# Patient Record
(demographics unavailable — no encounter records)

---

## 2025-04-03 NOTE — HISTORY OF PRESENT ILLNESS
[de-identified] : cough, headache, tired, sore throat since yesterday [FreeTextEntry6] : has 2 more pills of coycyline seen [facility] fro ? sinusiitis feeling better went on [place] trip 2 boys one sick and cough had s/t better 9 days inc 2 days cough now 2 days worse yesterday mucus did work out 2 days ago disucsed hold re sport/gym if ot feel well no fever little tire eating drinkning ok flutocasone start cough mucus no wheeze

## 2025-04-03 NOTE — HISTORY OF PRESENT ILLNESS
[de-identified] : cough, headache, tired, sore throat since yesterday [FreeTextEntry6] : has 2 more pills of coycyline seen [facility] fro ? sinusiitis feeling better went on [place] trip 2 boys one sick and cough had s/t better 9 days inc 2 days cough now 2 days worse yesterday mucus did work out 2 days ago disucsed hold re sport/gym if ot feel well no fever little tire eating drinkning ok flutocasone start cough mucus no wheeze

## 2025-04-03 NOTE — DISCUSSION/SUMMARY
[FreeTextEntry1] : add fluticasoe if strep positive give last day doxy tomorrow note written if not feel well not run sat  supportive care rapid covid test done

## 2025-04-03 NOTE — PHYSICAL EXAM
[TextEntry] : Gen: Awake, alert,  In no acute distress , well appearing Eyes: no periorbital swelling fells mild sinus pressure Ears : right  External Auditory Canal:  Normal. Tympanic Membrane:  Normal             left External Auditory Canal:  Normal   Tympanic Membrane:  Normal Pharynx: no redness ,no sores, not inflamed  Neck supple Lymph: anterior and submandibular glands no lymphadenopathy Cardiac : normal rate, regular rhythm, S1,S2 normal, no murmur Lungs: clear to auscultation, no crackles no wheeze, no grunting flaring or retractions Abdomen: soft, not tender, not distendedar

## 2025-07-01 NOTE — IMAGING
[de-identified] : The patient is a well appearing 18 year  old male of their stated age. Patient ambulates with a normal gait.   Ribs: Deformity: None Tenderness to Palpation: None Rib Compression Test: Negative   Thoracic Spine: Range of Motion: Unrestricted Deformity: None Tenderness to Palpation: None Sensation: Intact to Light Touch   Lumbar Spine: RANGE OF MOTION: Flexion: Unrestricted and without pain Extension: Unrestricted and WITH PAIN Rotation: Unrestricted and without pain   TENDERNESS TO PALPATION: Midline/Vertebral Bodies/Spinous Processes: Negative Paraspinal Muscles:  TENDER LT SIDED LUMBAR SI Joints:  POSITIVE LEFT SIDE   PROVOCATIVE TESTING: Piriformis Stretch Test: Negative Straight Leg Raise: Negative Seated Straight Leg Raise: Negative Pelvic Compression Test: Negative   INSPECTION: Deformity: Negative Erythema: Negative Ecchymosis: Negative Abrasions: Negative Muscle Spasm: Negative   NEUROLOGIC EXAM: Sensation L2-S1: Grossly Intact DTRs: 2+ and Symmetric Babinski: Negative Clonus: Negative   MOTOR EXAM: Quadriceps: 5 out of 5 Hamstrings: 5 out of 5 Hip ABduction: 5 out of 5 Hip ADduction: 5 out of 5 Hip Flexion: 5 out of 5 TA: 5 out of 5 GS: 5 out of 5 EHL: 5 out of 5 FHL: 5 out of 5   Circulatory/Pulses: Dorsalis Pedis: 2+ Posterior Tibialis: 2+    Assessment: The patient is a 18 year old male with left side lower back pain and radiographic and physical exam findings consistent with possible lumbar HNP vs spondylosis.  The patients condition is acute Documents/Results Reviewed Today: X-ray lumbar spine Tests/Studies Independently Interpreted Today: X-ray lumbar spine is benign with no acute bony abnormalities Pertinent findings include: Tender left SI joint, tender left paraspinal muscle, pain with hyperextension left central side, paresthesia symptoms down left side into buttock and hamstring, +SLR left  Confounding medical conditions/concerns: None   Plan: Patient presents with ongoing discomfort despite formal physical therapy for over 6 weeks and ongoing radicular symptoms. Due to worsening pain and instability with radicular symptoms, the patient will obtain MRI of lumbar spine to evaluate for possible HNP vs spondylosis. Patient will start physical therapy, HEP, and stretching.  Tests Ordered: MRI lumbar spine  Prescription Medications Ordered: Discussed appropriate use of OTC anti-inflammatories and analgesics (including but not limited to Aleve, Advil, Tylenol, Motrin, Ibuprofen, Voltaren gel, etc.) Braces/DME Ordered: None Activity/Work/Sports Status: None Additional Instructions: None Follow-Up: s/p MRI lumbar spine  The patient's current medication management of their orthopedic diagnosis was reviewed today: The patient declined and/or was contraindicated for the recommended prescription medication Naprosyn and will use over the counter Advil, Aleve, Voltaren Gel or Tylenol as directed. (1) We discussed a comprehensive treatment plan that included possible pharmaceutical management involving the use of prescription strength medications including but not limited to options such as oral Naprosyn 500mg BID, once daily Meloxicam 15 mg, or 500-650 mg Tylenol versus over the counter oral medications and topical prescription NSAID Pennsaid vs over the counter Voltaren gel.  Based on our extensive discussion, the patient declined prescription medication and will use over the counter Advil, Alleve, Voltaren Gel or Tylenol as directed. (2) There is a moderate risk of morbidity with further treatment, especially from use of prescription strength medications and possible side effects of these medications which include upset stomach with oral medications, skin reactions to topical medications and cardiac/renal issues with long term use. (3) I recommended that the patient follow-up with their medical physician to discuss any significant specific potential issues with long term medication use such as interactions with current medications or with exacerbation of underlying medical comorbidities. (4) The benefits and risks associated with use of injectable, oral or topical, prescription and over the counter anti-inflammatory medications were discussed with the patient. The patient voiced understanding of the risks including but not limited to bleeding, stroke, kidney dysfunction, heart disease, and were referred to the black box warning label for further information.     IYvonne attest that this documentation has been prepared under the direction and in the presence of Day Mares PA-C.  [FreeTextEntry1] : X-ray lumbar spine is benign with no acute bony abnormalities

## 2025-07-01 NOTE — HISTORY OF PRESENT ILLNESS
[de-identified] : The patient is a 18 year old RT hand dominant male  who presents today for left side low back pain. Date of Injury/Onset: 5/15/25 Pain: At Rest: 0/10 With Activity: 4/10 Mechanism of injury: in between races noticed he had right sided low back pain that then moved to left side only This is NOT a Work Related Injury being treated under Worker's Compensation. This IS an athletic injury occurring associated with an interscholastic or organized sports team. Quality of symptoms: tightness in his left glute, dull annoying, denies n/t down left leg Improves with: rest Worse with: lifting, forward flexion while core engagement, rowing  Prior treatment: [facility] in [place] - Dr. 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

## 2025-07-08 NOTE — IMAGING
[de-identified] : The patient is a well appearing 18 year  old male of their stated age. Patient ambulates with a normal gait.   Ribs: Deformity: None Tenderness to Palpation: None Rib Compression Test: Negative   Thoracic Spine: Range of Motion: Unrestricted Deformity: None Tenderness to Palpation: None Sensation: Intact to Light Touch   Lumbar Spine: RANGE OF MOTION: Flexion: Unrestricted and without pain Extension: Unrestricted and WITH PAIN Rotation: Unrestricted and without pain   TENDERNESS TO PALPATION: Midline/Vertebral Bodies/Spinous Processes: Negative Paraspinal Muscles:  TENDER LT SIDED LUMBAR SI Joints:  POSITIVE LEFT SIDE   PROVOCATIVE TESTING: Piriformis Stretch Test: Negative Straight Leg Raise: Negative Seated Straight Leg Raise: Negative Pelvic Compression Test: Negative   INSPECTION: Deformity: Negative Erythema: Negative Ecchymosis: Negative Abrasions: Negative Muscle Spasm: Negative   NEUROLOGIC EXAM: Sensation L2-S1: Grossly Intact DTRs: 2+ and Symmetric Babinski: Negative Clonus: Negative   MOTOR EXAM: Quadriceps: 5 out of 5 Hamstrings: 5 out of 5 Hip ABduction: 5 out of 5 Hip ADduction: 5 out of 5 Hip Flexion: 5 out of 5 TA: 5 out of 5 GS: 5 out of 5 EHL: 5 out of 5 FHL: 5 out of 5   Circulatory/Pulses: Dorsalis Pedis: 2+ Posterior Tibialis: 2+  Assessment: The patient is a 18 year old male with left side lower back pain and radiographic and physical exam findings consistent with lumbar disc herniation L4-L5.  The patients condition is acute Documents/Results Reviewed Today: MRI lumbar spine Tests/Studies Independently Interpreted Today: MRI lumbar spine reveals disc herniation L4-L5 Pertinent findings include: Tender left SI joint, tender left paraspinal muscle, pain with hyperextension left central side, paresthesia symptoms down left side into buttock and hamstring, +SLR left  Confounding medical conditions/concerns: None   Plan: Patient presents with ongoing discomfort despite formal physical therapy for over 6 weeks and ongoing radicular symptoms. Patient's MRI results reveal a disc herniation at L4-L5. Discussed treatment with the patient in the form of injections and steroids. Patient will start off by taking prescribed steroids and if pain persists will discuss receiving an injection at his follow up appointment. The patient was instructed to take a Medrol Dose Pack as directed for the next five days, after which they may use OTC NSAIDs as needed for pain, inflammation, and discomfort, but no NSAIDs should be taken simultaneously with the Medrol Dose Pack. Advised patient that he cannot drink alcohol while on this medication. Patient will continue physical therapy, HEP, and stretching. Modify activity as discussed. Patient will continue to follow up with pain management.  Tests Ordered: MRI lumbar spine  Prescription Medications Ordered: Medrol Dose Pack Braces/DME Ordered: None Activity/Work/Sports Status: As tolerated, avoid dead lifts and squatting Additional Instructions: None Follow-Up: with pain management   The patient's current medication management of their orthopedic diagnosis was reviewed today: (1) We discussed a comprehensive treatment plan that included possible pharmaceutical management involving the use of prescription strength medications including but not limited to options such as oral Naprosyn 500mg BID, once daily Meloxicam 15 mg, or 500-650 mg Tylenol versus over the counter oral medications and topical prescription NSAID Pennsaid vs over the counter Voltaren gel.  Based on our extensive discussion, the patient was prescribed a Medrol Dose Pack to be taken as directed for the next five days.  Following which OTC NSAIDs may be used PRN for pain, inflammation, and discomfort.  No NSAID medications should be taken concurrently with the Medrol Dose Pack. (2) There is a moderate risk of morbidity with further treatment, especially from use of prescription strength medications and possible side effects of these medications which include upset stomach with oral medications, skin reactions to topical medications and cardiac/renal issues with long term use. (3) I recommended that the patient follow-up with their medical physician to discuss any significant specific potential issues with long term medication use such as interactions with current medications or with exacerbation of underlying medical comorbidities. (4) The benefits and risks associated with use of injectable, oral or topical, prescription and over the counter anti-inflammatory medications were discussed with the patient. The patient voiced understanding of the risks including but not limited to bleeding, stroke, kidney dysfunction, heart disease, and were referred to the black box warning label for further information.  Yvonne RANDHAWA attest that this documentation has been prepared under the direction and in the presence of Provider Dr. Cam Jj.   The documentation recorded by the scribe accurately reflects the services IDr. Cam, personally performed and the decisions made by me.

## 2025-07-08 NOTE — HISTORY OF PRESENT ILLNESS
[de-identified] : The patient is a 18 year old RT hand dominant male  who presents today for left side low back pain. Date of Injury/Onset: 5/15/25 Pain: At Rest: 0/10 With Activity: 4/10 Mechanism of injury: in between races noticed he had right sided low back pain that then moved to left side only This is NOT a Work Related Injury being treated under Worker's Compensation. This IS an athletic injury occurring associated with an interscholastic or organized sports team. 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 Here to review MRI results. In PT 1x/week working on "adjustments" and was told to do planks for work on core strength. Continuing to see doctor for elevated liver enzymes.  Additional Information: was taking 2 advils 2-3x/day for ~2 weeks and then he was told he had elevated liver enzymes so stopped taking them

## 2025-07-08 NOTE — HISTORY OF PRESENT ILLNESS
[de-identified] : The patient is a 18 year old RT hand dominant male  who presents today for left side low back pain. Date of Injury/Onset: 5/15/25 Pain: At Rest: 0/10 With Activity: 4/10 Mechanism of injury: in between races noticed he had right sided low back pain that then moved to left side only This is NOT a Work Related Injury being treated under Worker's Compensation. This IS an athletic injury occurring associated with an interscholastic or organized sports team. 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 Here to review MRI results. In PT 1x/week working on "adjustments" and was told to do planks for work on core strength. Continuing to see doctor for elevated liver enzymes.  Additional Information: was taking 2 advils 2-3x/day for ~2 weeks and then he was told he had elevated liver enzymes so stopped taking them

## 2025-07-08 NOTE — IMAGING
[de-identified] : The patient is a well appearing 18 year  old male of their stated age. Patient ambulates with a normal gait.   Ribs: Deformity: None Tenderness to Palpation: None Rib Compression Test: Negative   Thoracic Spine: Range of Motion: Unrestricted Deformity: None Tenderness to Palpation: None Sensation: Intact to Light Touch   Lumbar Spine: RANGE OF MOTION: Flexion: Unrestricted and without pain Extension: Unrestricted and WITH PAIN Rotation: Unrestricted and without pain   TENDERNESS TO PALPATION: Midline/Vertebral Bodies/Spinous Processes: Negative Paraspinal Muscles:  TENDER LT SIDED LUMBAR SI Joints:  POSITIVE LEFT SIDE   PROVOCATIVE TESTING: Piriformis Stretch Test: Negative Straight Leg Raise: Negative Seated Straight Leg Raise: Negative Pelvic Compression Test: Negative   INSPECTION: Deformity: Negative Erythema: Negative Ecchymosis: Negative Abrasions: Negative Muscle Spasm: Negative   NEUROLOGIC EXAM: Sensation L2-S1: Grossly Intact DTRs: 2+ and Symmetric Babinski: Negative Clonus: Negative   MOTOR EXAM: Quadriceps: 5 out of 5 Hamstrings: 5 out of 5 Hip ABduction: 5 out of 5 Hip ADduction: 5 out of 5 Hip Flexion: 5 out of 5 TA: 5 out of 5 GS: 5 out of 5 EHL: 5 out of 5 FHL: 5 out of 5   Circulatory/Pulses: Dorsalis Pedis: 2+ Posterior Tibialis: 2+  Assessment: The patient is a 18 year old male with left side lower back pain and radiographic and physical exam findings consistent with lumbar disc herniation L4-L5.  The patients condition is acute Documents/Results Reviewed Today: MRI lumbar spine Tests/Studies Independently Interpreted Today: MRI lumbar spine reveals disc herniation L4-L5 Pertinent findings include: Tender left SI joint, tender left paraspinal muscle, pain with hyperextension left central side, paresthesia symptoms down left side into buttock and hamstring, +SLR left  Confounding medical conditions/concerns: None   Plan: Patient presents with ongoing discomfort despite formal physical therapy for over 6 weeks and ongoing radicular symptoms. Patient's MRI results reveal a disc herniation at L4-L5. Discussed treatment with the patient in the form of injections and steroids. Patient will start off by taking prescribed steroids and if pain persists will discuss receiving an injection at his follow up appointment. The patient was instructed to take a Medrol Dose Pack as directed for the next five days, after which they may use OTC NSAIDs as needed for pain, inflammation, and discomfort, but no NSAIDs should be taken simultaneously with the Medrol Dose Pack. Advised patient that he cannot drink alcohol while on this medication. Patient will continue physical therapy, HEP, and stretching. Modify activity as discussed. Patient will continue to follow up with pain management.  Tests Ordered: MRI lumbar spine  Prescription Medications Ordered: Medrol Dose Pack Braces/DME Ordered: None Activity/Work/Sports Status: As tolerated, avoid dead lifts and squatting Additional Instructions: None Follow-Up: with pain management   The patient's current medication management of their orthopedic diagnosis was reviewed today: (1) We discussed a comprehensive treatment plan that included possible pharmaceutical management involving the use of prescription strength medications including but not limited to options such as oral Naprosyn 500mg BID, once daily Meloxicam 15 mg, or 500-650 mg Tylenol versus over the counter oral medications and topical prescription NSAID Pennsaid vs over the counter Voltaren gel.  Based on our extensive discussion, the patient was prescribed a Medrol Dose Pack to be taken as directed for the next five days.  Following which OTC NSAIDs may be used PRN for pain, inflammation, and discomfort.  No NSAID medications should be taken concurrently with the Medrol Dose Pack. (2) There is a moderate risk of morbidity with further treatment, especially from use of prescription strength medications and possible side effects of these medications which include upset stomach with oral medications, skin reactions to topical medications and cardiac/renal issues with long term use. (3) I recommended that the patient follow-up with their medical physician to discuss any significant specific potential issues with long term medication use such as interactions with current medications or with exacerbation of underlying medical comorbidities. (4) The benefits and risks associated with use of injectable, oral or topical, prescription and over the counter anti-inflammatory medications were discussed with the patient. The patient voiced understanding of the risks including but not limited to bleeding, stroke, kidney dysfunction, heart disease, and were referred to the black box warning label for further information.  Yvonne RANDHAWA attest that this documentation has been prepared under the direction and in the presence of Provider Dr. Cam Jj.   The documentation recorded by the scribe accurately reflects the services IDr. Cam, personally performed and the decisions made by me.

## 2025-07-19 NOTE — HISTORY OF PRESENT ILLNESS
[Lower back] : lower back [Buttock] : buttock [Left Leg] : left leg [3] : 3 [Burning] : burning [Dull/Aching] : dull/aching [Radiating] : radiating [Throbbing] : throbbing [Tightness] : tightness [Intermittent] : intermittent [Household chores] : household chores [Leisure] : leisure [Meds] : meds [Physical therapy] : physical therapy [Sitting] : sitting [Bending forward] : bending forward [Extending back] : extending back [FreeTextEntry1] : 7/19/2025: FREDO BROWN is a 18 year-old man presenting for a NPV for a history of low back pain. Patient has been rowing competitively in high school. Noted onset of significant pain in the low back during competitions in May. Has been doing physical therapy, did an oral steroid taper with some relief. However, notes that pain has returned. At this point, notes pain across the low lumbar region w/ radiation to the right gluteal region and posterior thigh. No focal numbness or weakness in the lower extremities. No bowel or bladder incontinence or saddle anesthesia. The patient states that average pain over the past week was 3/10 in severity. Mood: No depression or anxiety.  Sleep: No difficulty with sleep 2/2 pain.  [] : no [FreeTextEntry6] : stiff and sore  [FreeTextEntry7] : L leg  [FreeTextEntry9] : Medrol  [de-identified] : Running  [de-identified] : x-ray and MRI

## 2025-07-19 NOTE — DISCUSSION/SUMMARY
[de-identified] : FREDO BROWN is a 18 year-old man presenting for a NPV for a history of low back pain.   Prior treatment: Physical therapy x4 weeks Oral steroid taper Patient has participated and failed at least 6 weeks of conservative therapy including physical therapy and a prescribed home exercise program as well as 6 weeks of activity modifications, including heat, ice, rest, and over the counter medications.  Plan: 1) MRI lumbar spine images reviewed with the patient. 2) Schedule L4-L5 ILESI w/o MAC. The procedure was explained to the patient in detail. Reviewed risks, benefits, and alternatives with the patient. Some risks discussed included temporary increase in pain, bleeding, infection, and side effects from steroids. The patient expressed understanding and would like to proceed. 3) Continue home exercise regimen 4) RTC post procedure

## 2025-07-19 NOTE — DISCUSSION/SUMMARY
[de-identified] : FREDO BROWN is a 18 year-old man presenting for a NPV for a history of low back pain.   Prior treatment: Physical therapy x4 weeks Oral steroid taper Patient has participated and failed at least 6 weeks of conservative therapy including physical therapy and a prescribed home exercise program as well as 6 weeks of activity modifications, including heat, ice, rest, and over the counter medications.  Plan: 1) MRI lumbar spine images reviewed with the patient. 2) Schedule L4-L5 ILESI w/o MAC. The procedure was explained to the patient in detail. Reviewed risks, benefits, and alternatives with the patient. Some risks discussed included temporary increase in pain, bleeding, infection, and side effects from steroids. The patient expressed understanding and would like to proceed. 3) Continue home exercise regimen 4) RTC post procedure

## 2025-07-19 NOTE — DATA REVIEWED
[MRI] : MRI [Lumbar Spine] : lumbar spine [Report was reviewed and noted in the chart] : The report was reviewed and noted in the chart [I independently reviewed and interpreted images and report] : I independently reviewed and interpreted images and report [FreeTextEntry1] : 7/2/2025 MRI Lumbar Spine O&C L4-L5: slight bilateral facet hypertrophy; shallow LEFT paracentral/foraminal herniation impinging on the LEFT L4 and L5 nerve root w/ mild central stenosis on the left; moderate LEFT NF narrowing

## 2025-07-19 NOTE — HISTORY OF PRESENT ILLNESS
[Lower back] : lower back [Buttock] : buttock [Left Leg] : left leg [3] : 3 [Burning] : burning [Dull/Aching] : dull/aching [Radiating] : radiating [Throbbing] : throbbing [Tightness] : tightness [Intermittent] : intermittent [Household chores] : household chores [Leisure] : leisure [Meds] : meds [Physical therapy] : physical therapy [Sitting] : sitting [Bending forward] : bending forward [Extending back] : extending back [FreeTextEntry1] : 7/19/2025: FREDO BROWN is a 18 year-old man presenting for a NPV for a history of low back pain. Patient has been rowing competitively in high school. Noted onset of significant pain in the low back during competitions in May. Has been doing physical therapy, did an oral steroid taper with some relief. However, notes that pain has returned. At this point, notes pain across the low lumbar region w/ radiation to the right gluteal region and posterior thigh. No focal numbness or weakness in the lower extremities. No bowel or bladder incontinence or saddle anesthesia. The patient states that average pain over the past week was 3/10 in severity. Mood: No depression or anxiety.  Sleep: No difficulty with sleep 2/2 pain.  [] : no [FreeTextEntry6] : stiff and sore  [FreeTextEntry7] : L leg  [FreeTextEntry9] : Medrol  [de-identified] : Running  [de-identified] : x-ray and MRI

## 2025-07-30 NOTE — HISTORY OF PRESENT ILLNESS
[de-identified] : S/T, congestion, mucus phlegm and nausea for 3-4 days, headache for 2 days, no cough, no fever, no any other symtpms reported [FreeTextEntry6] : FREDO  is here today for a history of sore throat congestion, history of headache symptoms for about 3 to 4 days sore throat for few days increased today 3 to 4 days not if started sat no fever congestion  headache back of head 2nd day resolved some sinus pressure around nose has fluticasone not start med appetite normal , no vomiting no diarrhea, no dysuria no abdominal pain, at home no cough no breathing issues no chest tightness not sure if  has appt GI re elevated liver enzymes would like rapid covid 19 and flu test

## 2025-07-30 NOTE — PHYSICAL EXAM
[TextEntry] : Gen: Awake, alert,  In no acute distress , well appearing Eyes: no periorbital swelling sclera non icteric Ears : right  External Auditory Canal:  Normal. Tympanic Membrane:  Normal            left External Auditory Canal:  Normal   Tympanic Membrane:  Normal Nose:  nasal discharge clear  mild sinus tenderss maxiallry Pharynx; erythema , no sores no trismus  Neck supple sclera non icteric Lymph: anterior and submandibular glands no lymphadenopathy Cardiac : normal rate, regular rhythm, S1,S2 normal, no murmur Lungs: clear to auscultation, no crackles no wheeze, no grunting flaring or retractions Abdomen: soft,mild tenderness mid and mid lower no hepatosplenomegaly , not distended

## 2025-07-30 NOTE — HISTORY OF PRESENT ILLNESS
[de-identified] : S/T, congestion, mucus phlegm and nausea for 3-4 days, headache for 2 days, no cough, no fever, no any other symtpms reported [FreeTextEntry6] : FREDO  is here today for a history of sore throat congestion, history of headache symptoms for about 3 to 4 days sore throat for few days increased today 3 to 4 days not if started sat no fever congestion  headache back of head 2nd day resolved some sinus pressure around nose has fluticasone not start med appetite normal , no vomiting no diarrhea, no dysuria no abdominal pain, at home no cough no breathing issues no chest tightness not sure if  has appt GI re elevated liver enzymes would like rapid covid 19 and flu test

## 2025-07-30 NOTE — DISCUSSION/SUMMARY
[FreeTextEntry1] : Patient  aware rapid Covid 19 test negative rapid flu test negative rapid strep negative throat culture if positive give Amoxicillin 875 mg one tablet po bid for 10 days start fluticasone one spray bid to each nostril has at [facility] care Symptomatic treatment Next visit recheck  if worsening symptoms. abdominal pain with exam mid and mid lower, no abdominal pain  prior to exam scheduled for epidural steroid injection in 3 days  advised Esdras to   to update clinician  of current symptoms  as may need to postpone procedure will viral illness.

## 2025-07-30 NOTE — DISCUSSION/SUMMARY
[FreeTextEntry1] : Patient  aware rapid Covid 19 test negative rapid flu test negative rapid strep negative throat culture if positive give Amoxicillin 875 mg one tablet po bid for 10 days start fluticasone one spray bid to each nostril has at [facility] care Symptomatic treatment Next visit recheck  if worsening symptoms. abdominal pain with exam mid and mid lower, no abdominal pain  prior to exam scheduled for epidural steroid injection in 3 days  advised Esdras to   to update clinician  of current symptoms  as may need to postpone procedure will viral illness.